# Patient Record
Sex: MALE | Race: WHITE | Employment: FULL TIME | URBAN - METROPOLITAN AREA
[De-identification: names, ages, dates, MRNs, and addresses within clinical notes are randomized per-mention and may not be internally consistent; named-entity substitution may affect disease eponyms.]

---

## 2024-08-28 ENCOUNTER — HOSPITAL ENCOUNTER (EMERGENCY)
Facility: HOSPITAL | Age: 39
Discharge: HOME | End: 2024-08-28

## 2024-08-28 VITALS
TEMPERATURE: 98.1 F | OXYGEN SATURATION: 99 % | WEIGHT: 240 LBS | DIASTOLIC BLOOD PRESSURE: 103 MMHG | RESPIRATION RATE: 16 BRPM | SYSTOLIC BLOOD PRESSURE: 163 MMHG | HEART RATE: 71 BPM | BODY MASS INDEX: 31.81 KG/M2 | HEIGHT: 73 IN

## 2024-08-28 DIAGNOSIS — R04.0 EPISTAXIS: Primary | ICD-10-CM

## 2024-08-28 PROCEDURE — 30901 CONTROL OF NOSEBLEED: CPT

## 2024-08-28 PROCEDURE — 99283 EMERGENCY DEPT VISIT LOW MDM: CPT

## 2024-08-28 RX ORDER — OXYMETAZOLINE HYDROCHLORIDE 0.05 G/100ML
2 SPRAY NASAL 2 TIMES DAILY
Qty: 6 ML | Refills: 0 | Status: SHIPPED | OUTPATIENT
Start: 2024-08-28

## 2024-08-28 ASSESSMENT — PAIN - FUNCTIONAL ASSESSMENT: PAIN_FUNCTIONAL_ASSESSMENT: NONE - DENIES PAIN

## 2024-08-29 NOTE — ED TRIAGE NOTES
Pt reports recurrent nosebleeds. Pt with hx of HTN but states he does not take BP medication. Pt with elevated BP in triage

## 2024-08-29 NOTE — ED PROVIDER NOTES
[facility] EMERGENCY DEPT  EMERGENCY DEPARTMENT HISTORY AND PHYSICAL EXAM      Date: 8/28/2024  Patient Name: Kang Hedrick  MRN: [id number]  YOB: 1985  Date of evaluation: 8/28/2024  Provider: Lyubov Mojica PA-C   Note Started: 9:34 PM EDT 8/28/24    HISTORY OF PRESENT ILLNESS     Chief Complaint   Patient presents with    Epistaxis       History Provided By: Patient    HPI: Kang Hedrick is a 39 y.o. male with past medical history of hypertension, presents for evaluation of nosebleeds.  Patient states he has been getting recurrent nosebleeds for the past 3 weeks.  He denies any trauma, digital trauma or excessive blowing of his nose.  States he is normally able to get it to stop.  This 1 that began at work he has been unable to get to stop.  He also endorses a history of high blood pressure, however stopped taking his medication on his own because it \"made him feel weird\".  He denies any shortness of breath, difficulty breathing, chest pain.    PAST MEDICAL HISTORY   Past Medical History:  No past medical history on file.    Past Surgical History:  No past surgical history on file.    Family History:  No family history on file.    Social History:       Allergies:  No Known Allergies    PCP: No primary care provider on file.    Current Meds:   No current facility-administered medications for this encounter.     Current Outpatient Medications   Medication Sig Dispense Refill    oxymetazoline (12 HOUR NASAL SPRAY) 0.05 % nasal spray 2 sprays by Nasal route 2 times daily 6 mL 0       Social Determinants of Health:   Social Determinants of Health     Tobacco Use: Not on file   Alcohol Use: Not on file   Financial Resource Strain: Not on file   Food Insecurity: Not on file   Transportation Needs: Not on file   Physical Activity: Not on file   Stress: Not on file   Social Connections: Not on file   Intimate Partner Violence: Not on file   Depression: Not on file   Housing Stability: Not on file  to ER should their symptoms worsen.      PATIENT REFERRED TO:  Rebecca Donovan MD  [address] [address] [phone]      ENT        DISCHARGE MEDICATIONS:     Medication List        START taking these medications      oxymetazoline 0.05 % nasal spray  Commonly known as: 12 Hour Nasal [place]  2 sprays by Nasal route 2 times daily               Where to Get Your Medications        These medications were sent to [facility]/pharmacy [address] [place] [address] - [address] [facility] - P [phone] - F [phone]  [address] [facility] [address]      Phone: [phone]   oxymetazoline 0.05 % nasal spray           DISCONTINUED MEDICATIONS:  Discharge Medication List as of 8/28/2024 11:08 PM          I am the Primary Clinician of Record: Lyubov Mojica PA-C (electronically signed)    (Please note that parts of this dictation were completed with voice recognition software. Quite often unanticipated grammatical, syntax, homophones, and other interpretive errors are inadvertently transcribed by the computer software. Please disregards these errors. Please excuse any errors that have escaped final proofreading.)     Lyubov Mojica PA-C  08/30/24 0221